# Patient Record
Sex: FEMALE | Race: WHITE | Employment: FULL TIME | URBAN - METROPOLITAN AREA
[De-identification: names, ages, dates, MRNs, and addresses within clinical notes are randomized per-mention and may not be internally consistent; named-entity substitution may affect disease eponyms.]

---

## 2017-01-25 ENCOUNTER — APPOINTMENT (OUTPATIENT)
Dept: LAB | Age: 57
End: 2017-01-25
Attending: INTERNAL MEDICINE
Payer: COMMERCIAL

## 2017-01-25 DIAGNOSIS — K57.90 DIVERTICULOSIS OF INTESTINE WITHOUT BLEEDING, UNSPECIFIED INTESTINAL TRACT LOCATION: ICD-10-CM

## 2017-01-25 PROCEDURE — 88305 TISSUE EXAM BY PATHOLOGIST: CPT

## 2017-01-26 NOTE — PROGRESS NOTES
Quick Note:    Here are the biopsy/pathology results from your recent Colonoscopy:     The biopsies taken of the colon lining were benign and did not show any evidence of inflammation, there was no evidence of collagenous or lymphocytic colitis (rare types

## (undated) NOTE — Clinical Note
1/27/2017          Ani Choi  [address] [facility], [phone] [id number]    Dear Gloria Elliott,     Here are the biopsy/pathology results from your recent Colonoscopy:     The biopsies taken of the colon lining were benign and did not show any evidence of infl